# Patient Record
(demographics unavailable — no encounter records)

---

## 2025-01-02 NOTE — ASSESSMENT
[Levothyroxine] : The patient was instructed to take Levothyroxine on an empty stomach, separate from vitamins, and wait at least 30 minutes before eating [FreeTextEntry1] :  Merly had L hemithyroidectomy in July 2020 for PTC and in Dec 2020 she had completion surgery and a microcarcinoma was discovered. She had Miller March 2021. target TSH  0.1  Tsh 0.116 . Decrease  LT4 to 100 mcg qd 6days/week   check Tg and Tg ab today   Vitamin d defic : cont vit d supplements  Obesity: lost 13 lbs. Discussed diet and exercise  Prediabetes : a1c 5.5 .She lost 13 lbs due to low carb diet and better diet choices. Encouraged more exercise walking 30 min 3 x week   HLD: lipids much better since lost weight.   All lab results reviewed independently and discussed with patient with extensive discussion.  All questions answered Laboratory tests ordered today Continue other current medications

## 2025-01-02 NOTE — HISTORY OF PRESENT ILLNESS
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 \par  Pt just had completion surgery Dec 4. 2020. \par  Surgical Final Report\par  Thyroid, right lobe, completion thyroidectomy\par  - Incidental papillary microcarcinoma, infiltrative follicular variant, 0.5 mm in greatest dimension.\par  - No lymphovascular or perineural invasion identified.\par  - The resection margins are negative for carcinoma\par  - One minute, unremarkable lymph node.\par  - Focal unremarkable parathyroid tissue.\par  \par  \par  \par  \par  \par  \par  \par  \par  \par  \par  \par  \par   \par

## 2025-01-02 NOTE — PHYSICAL EXAM
[Alert] : alert [Well Nourished] : well nourished [Obese] : obese [No Acute Distress] : no acute distress [Well Developed] : well developed [Normal Sclera/Conjunctiva] : normal sclera/conjunctiva [EOMI] : extra ocular movement intact [No Proptosis] : no proptosis [Normal Oropharynx] : the oropharynx was normal [Well Healed Scar] : well healed scar [No Respiratory Distress] : no respiratory distress [No Accessory Muscle Use] : no accessory muscle use [Clear to Auscultation] : lungs were clear to auscultation bilaterally [Normal S1, S2] : normal S1 and S2 [Normal Rate] : heart rate was normal [Regular Rhythm] : with a regular rhythm [No Edema] : no peripheral edema [Pedal Pulses Normal] : the pedal pulses are present [Normal Bowel Sounds] : normal bowel sounds [Not Tender] : non-tender [Not Distended] : not distended [Soft] : abdomen soft [No Tremors] : no tremors [Oriented x3] : oriented to person, place, and time [Acanthosis Nigricans] : no acanthosis nigricans

## 2025-07-09 NOTE — PHYSICAL EXAM
[General Appearance - Alert] : alert [General Appearance - In No Acute Distress] : in no acute distress [General Appearance - Well-Appearing] : healthy appearing [Oriented To Time, Place, And Person] : oriented to person, place, and time [Impaired Insight] : insight and judgment were intact [Affect] : the affect was normal [Memory Recent] : recent memory was not impaired [Person] : oriented to person [Place] : oriented to place [Time] : oriented to time [Short Term Intact] : short term memory intact [Remote Intact] : remote memory intact [Registration Intact] : recent registration memory intact [Span Intact] : the attention span was normal [Concentration Intact] : normal concentrating ability [Visual Intact] : visual attention was ~T not ~L decreased [Naming Objects] : no difficulty naming common objects [Repeating Phrases] : no difficulty repeating a phrase [Fluency] : fluency intact [Comprehension] : comprehension intact [Past History] : adequate knowledge of personal past history [Cranial Nerves Optic (II)] : visual acuity intact bilaterally,  visual fields full to confrontation, pupils equal round and reactive to light [Cranial Nerves Oculomotor (III)] : extraocular motion intact [Cranial Nerves Trigeminal (V)] : facial sensation intact symmetrically [Cranial Nerves Facial (VII)] : face symmetrical [Cranial Nerves Vestibulocochlear (VIII)] : hearing was intact bilaterally [Cranial Nerves Glossopharyngeal (IX)] : tongue and palate midline [Cranial Nerves Accessory (XI - Cranial And Spinal)] : head turning and shoulder shrug symmetric [Cranial Nerves Hypoglossal (XII)] : there was no tongue deviation with protrusion [Motor Tone] : muscle tone was normal in all four extremities [Motor Strength] : muscle strength was normal in all four extremities [No Muscle Atrophy] : normal bulk in all four extremities [Paresis Pronator Drift Right-Sided] : no pronator drift on the right [Paresis Pronator Drift Left-Sided] : no pronator drift on the left [Sensation Tactile Decrease] : light touch was intact [Sensation Pain / Temperature Decrease] : pain and temperature was intact [Romberg's Sign] : Romberg's sign was negtive [Abnormal Walk] : normal gait [Balance] : balance was intact [Past-pointing] : there was no past-pointing [Tremor] : no tremor present [Coordination - Dysmetria Impaired Finger-to-Nose Bilateral] : not present [2+] : Patella left 2+ [FreeTextEntry4] : Short-term recall 3/3 [PERRL With Normal Accommodation] : pupils were equal in size, round, reactive to light, with normal accommodation [Extraocular Movements] : extraocular movements were intact [Full Visual Field] : full visual field

## 2025-07-09 NOTE — HISTORY OF PRESENT ILLNESS
[FreeTextEntry1] : This 53-year-old woman was seen in neurological consultation today She reports poor memory going on for about the last 3 years which she thinks is getting worse She will forget names at times At times she will misplace things She works in [employer] at [employer] and has no trouble at work She drives without a problem She manages fine with all activities of daily living She lives with her boyfriend and she says that he does not complain about her memory  Medical history sniffing for hypothyroid Also history of migraine headaches for which she sees another neurologist and she is on Aimovig which works well  Non-smoker, no alcohol use

## 2025-07-09 NOTE — ASSESSMENT
[FreeTextEntry1] : Reports memory problems going on for about the last 3 years Did well on mental status testing in the office today  To look for possible underlying etiologies we will check a brain MRI, EEG, and B12 She is on thyroid medicine and gets her thyroid levels checked routinely  Further discussion will be held once the workup is completed  I have stressed the importance of mental and physical activity, adequate hydration, and eating a healthy Mediterranean style diet.

## 2025-07-30 NOTE — ASSESSMENT
[FreeTextEntry1] : Merly had L hemithyroidectomy in July 2020 for PTC and in Dec 2020 she had completion surgery and a microcarcinoma was discovered. She had Miller March 2021. target TSH  0.1  tSh a littl ehigh. Decrease Lt4 to 100 mcg qd and take 1/2 pill on Sundays.    Tg 0.9 a little higher  < -- 0.1 check neck US now.  May consider Tg stimulated  WBS.    Vitamin d defic : cont vit d supplements  Obesity: lost 6  lbs. Discussed diet and exercise  Prediabetes :  lost 19 lbs due to low carb diet  and walking .  Encouraged more exercise walking 30 min 3 x week   HLD: lipids much better since lost weight.   All lab results reviewed independently and discussed with patient with extensive discussion.  All questions answered Laboratory tests ordered today Continue other current medications